# Patient Record
Sex: FEMALE | Race: WHITE | Employment: FULL TIME | URBAN - METROPOLITAN AREA
[De-identification: names, ages, dates, MRNs, and addresses within clinical notes are randomized per-mention and may not be internally consistent; named-entity substitution may affect disease eponyms.]

---
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
## 2020-10-30 ENCOUNTER — TELEPHONE (OUTPATIENT)
Dept: PRIMARY CARE CLINIC | Age: 20
End: 2020-10-30

## 2020-10-30 NOTE — TELEPHONE ENCOUNTER
Pt states that her sister was dx'd with covid today. Symptoms started on Mon the 26th. Pt lives with her and is states that she is taking a  class and was told she had to be tested. Doesn't she need to quarantine 14 days after her sister does the 10 day and w/out fever symptoms for 3 days. Pt wants an order to be tested, she has no symptoms.

## 2020-10-31 ENCOUNTER — HOSPITAL ENCOUNTER (OUTPATIENT)
Age: 20
Discharge: HOME | End: 2020-10-31
Payer: COMMERCIAL

## 2020-11-01 ENCOUNTER — HOSPITAL ENCOUNTER (OUTPATIENT)
Age: 20
Discharge: HOME | End: 2020-11-01
Payer: COMMERCIAL

## 2020-11-01 PROCEDURE — U0003 INFECTIOUS AGENT DETECTION BY NUCLEIC ACID (DNA OR RNA); SEVERE ACUTE RESPIRATORY SYNDROME CORONAVIRUS 2 (SARS-COV-2) (CORONAVIRUS DISEASE [COVID-19]), AMPLIFIED PROBE TECHNIQUE, MAKING USE OF HIGH THROUGHPUT TECHNOLOGIES AS DESCRIBED BY CMS-2020-01-R: HCPCS

## 2020-11-02 LAB
SARS-COV-2, RAPID: NORMAL
SARS-COV-2: NORMAL
SARS-COV-2: NOT DETECTED
SOURCE: NORMAL

## 2021-02-15 ENCOUNTER — TELEPHONE (OUTPATIENT)
Dept: PRIMARY CARE CLINIC | Age: 21
End: 2021-02-15

## 2021-02-18 ENCOUNTER — NURSE ONLY (OUTPATIENT)
Dept: PRIMARY CARE CLINIC | Age: 21
End: 2021-02-18
Payer: COMMERCIAL

## 2021-02-18 VITALS
HEIGHT: 65 IN | SYSTOLIC BLOOD PRESSURE: 102 MMHG | WEIGHT: 123.4 LBS | BODY MASS INDEX: 20.56 KG/M2 | TEMPERATURE: 97.5 F | DIASTOLIC BLOOD PRESSURE: 74 MMHG

## 2021-02-18 DIAGNOSIS — Z00.00 HEALTH CARE MAINTENANCE: Primary | ICD-10-CM

## 2021-02-18 DIAGNOSIS — Z23 NEED FOR INFLUENZA VACCINATION: ICD-10-CM

## 2021-02-18 DIAGNOSIS — Z88.9 MULTIPLE ALLERGIES: ICD-10-CM

## 2021-02-18 DIAGNOSIS — Z30.09 ENCOUNTER FOR COUNSELING REGARDING CONTRACEPTION: ICD-10-CM

## 2021-02-18 LAB
CONTROL: NORMAL
PREGNANCY TEST URINE, POC: NEGATIVE

## 2021-02-18 PROCEDURE — 99395 PREV VISIT EST AGE 18-39: CPT | Performed by: PHYSICIAN ASSISTANT

## 2021-02-18 PROCEDURE — 90471 IMMUNIZATION ADMIN: CPT | Performed by: PHYSICIAN ASSISTANT

## 2021-02-18 PROCEDURE — 90686 IIV4 VACC NO PRSV 0.5 ML IM: CPT | Performed by: PHYSICIAN ASSISTANT

## 2021-02-18 PROCEDURE — 81025 URINE PREGNANCY TEST: CPT | Performed by: PHYSICIAN ASSISTANT

## 2021-02-18 RX ORDER — FLUTICASONE PROPIONATE 50 MCG
1 SPRAY, SUSPENSION (ML) NASAL DAILY
Qty: 3 BOTTLE | Refills: 3 | Status: SHIPPED | OUTPATIENT
Start: 2021-02-18

## 2021-02-18 RX ORDER — CETIRIZINE HYDROCHLORIDE 10 MG/1
10 TABLET ORAL DAILY
Qty: 90 TABLET | Refills: 3 | Status: SHIPPED | OUTPATIENT
Start: 2021-02-18 | End: 2021-12-27 | Stop reason: SDUPTHER

## 2021-02-18 SDOH — ECONOMIC STABILITY: TRANSPORTATION INSECURITY
IN THE PAST 12 MONTHS, HAS LACK OF TRANSPORTATION KEPT YOU FROM MEETINGS, WORK, OR FROM GETTING THINGS NEEDED FOR DAILY LIVING?: PATIENT DECLINED

## 2021-02-18 SDOH — ECONOMIC STABILITY: INCOME INSECURITY: HOW HARD IS IT FOR YOU TO PAY FOR THE VERY BASICS LIKE FOOD, HOUSING, MEDICAL CARE, AND HEATING?: PATIENT DECLINED

## 2021-02-18 ASSESSMENT — ENCOUNTER SYMPTOMS
CONSTIPATION: 0
CHEST TIGHTNESS: 0
BLOOD IN STOOL: 0
VOMITING: 0
COUGH: 0
SHORTNESS OF BREATH: 0
VOICE CHANGE: 0
EYE ITCHING: 0
EYE PAIN: 0
NAUSEA: 0
ABDOMINAL PAIN: 0
DIARRHEA: 0
TROUBLE SWALLOWING: 0
BACK PAIN: 0

## 2021-02-18 ASSESSMENT — PATIENT HEALTH QUESTIONNAIRE - PHQ9
1. LITTLE INTEREST OR PLEASURE IN DOING THINGS: 0
SUM OF ALL RESPONSES TO PHQ9 QUESTIONS 1 & 2: 0

## 2021-02-18 NOTE — PROGRESS NOTES
[facility]  [address] [place] [address]  Phone: [phone]  Fax: [phone]    Analisa Rene is a 24 y.o. female who presents today for her medical conditions/complaintsas noted below. 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 Pap--never     Past Medical History:   Diagnosis Date    Allergic rhinitis       Past Surgical History:   Procedure Laterality Date    TIBIA FRACTURE SURGERY Right 01/2018    TONSILLECTOMY       Family History   Problem Relation Age of Onset    Diabetes Mother     Diabetes Father     Heart Disease Father         CHF    Hypertension Father      Social History     Tobacco Use    Smoking status: Never Smoker    Smokeless tobacco: Never Used   Substance Use Topics    Alcohol use: No      Current Outpatient Medications   Medication Sig Dispense Refill    desogestrel-ethinyl estradiol (CYCLESSA) 0.1/0.125/0.15 -0.025 MG tablet Take 1 tablet by mouth daily 28 tablet 3    fluticasone (FLONASE) 50 MCG/ACT nasal spray 1 spray by Nasal route daily 3 Bottle 3    cetirizine (ZYRTEC) 10 MG tablet Take 1 tablet by mouth daily 90 tablet 3     No current facility-administered medications for this visit.       Allergies   Allergen Reactions    Cat Hair Extract     Dust Mite Extract     Seasonal        Health Maintenance   Topic Date Due    Hepatitis C screen  2000    HPV vaccine (1 - 2-dose series) 01/20/2011    HIV screen  01/20/2015    Chlamydia screen  01/20/2016    Hepatitis A vaccine (2 of 2 - 2-dose series) 01/07/2018    Cervical cancer screen  01/20/2021    DTaP/Tdap/Td vaccine (7 - Td) 04/11/2022    Hepatitis B vaccine  Completed    Hib vaccine Completed    Varicella vaccine  Completed    Meningococcal (ACWY) vaccine  Completed    Flu vaccine  Completed    Pneumococcal 0-64 years Vaccine  Aged Out       Subjective:      Review of Systems   Constitutional: Negative for activity change, appetite change, chills, fatigue, fever and unexpected weight change. HENT: Negative for dental problem, hearing loss, trouble swallowing and voice change. Eyes: Negative for pain, itching and visual disturbance. Respiratory: Negative for cough, chest tightness and shortness of breath. Cardiovascular: Negative for chest pain, palpitations and leg swelling. Gastrointestinal: Negative for abdominal pain, blood in stool, constipation, diarrhea, nausea and vomiting. Endocrine: Negative for cold intolerance, heat intolerance, polydipsia, polyphagia and polyuria. Genitourinary: Negative for difficulty urinating, dysuria, flank pain, frequency, hematuria, menstrual problem, pelvic pain, urgency, vaginal bleeding, vaginal discharge and vaginal pain. Musculoskeletal: Negative for arthralgias, back pain and myalgias. Skin: Negative for rash. Neurological: Negative for dizziness, syncope, speech difficulty, light-headedness and headaches. Hematological: Does not bruise/bleed easily. Psychiatric/Behavioral: Negative for dysphoric mood and sleep disturbance. The patient is not nervous/anxious. Objective:     Vitals:    02/18/21 0908   BP: 102/74   Temp: 97.5 °F (36.4 °C)   Weight: 123 lb 6.4 oz (56 kg)   Height: 5' 5.35\" (1.66 m)     Physical Exam  Vitals signs reviewed. Constitutional:       General: She is not in acute distress. Appearance: She is well-developed. HENT:      Head: Normocephalic and atraumatic. Right Ear: External ear normal.      Left Ear: External ear normal.      Nose: Nose normal.      Mouth/Throat:      Pharynx: No oropharyngeal exudate. Eyes:      General: No scleral icterus. Right eye: No discharge. Left eye: No discharge. Conjunctiva/sclera: Conjunctivae normal.      Pupils: Pupils are equal, round, and reactive to light. Neck:      Musculoskeletal: Normal range of motion. Thyroid: No thyromegaly. Cardiovascular:      Rate and Rhythm: Normal rate and regular rhythm. Heart sounds: Normal heart sounds. No murmur. No friction rub. No gallop. Pulmonary:      Effort: Pulmonary effort is normal.      Breath sounds: Normal breath sounds. No wheezing or rales. Abdominal:      General: Bowel sounds are normal. There is no distension. Palpations: Abdomen is soft. There is no mass. Tenderness: There is no abdominal tenderness. There is no guarding or rebound. Musculoskeletal: Normal range of motion. General: No deformity. Lymphadenopathy:      Cervical: No cervical adenopathy. Skin:     General: Skin is warm. Capillary Refill: Capillary refill takes less than 2 seconds. Findings: No rash. Neurological:      Mental Status: She is alert and oriented to person, place, and time. Motor: No abnormal muscle tone. Coordination: Coordination normal.   Psychiatric:         Behavior: Behavior normal.         Thought Content: Thought content normal.         Judgment: Judgment normal.         Assessment:      Diagnosis Orders   1. Health care maintenance     2. Need for influenza vaccination  INFLUENZA, QUADV, 3 YRS AND OLDER, IM PF, PREFILL SYR OR SDV, 0.5ML (AFLURIA QUADV, PF)   3. Encounter for counseling regarding contraception  desogestrel-ethinyl estradiol (CYCLESSA) 0.1/0.125/0.15 -0.025 MG tablet    POCT urine pregnancy   4.  Multiple allergies  fluticasone (FLONASE) 50 MCG/ACT nasal spray    cetirizine (ZYRTEC) 10 MG tablet         Plan:   Discussed options OCP  Discussed risks and benefits of OCP   Copy of vaccines to her  Will complete from when she brings it  Urine preg is negative  Sent Cyclessa  Discussed well woman exams  Flu vax today   Return in about 3 months (around 2021) for Well woman. Orders Placed This Encounter   Procedures    INFLUENZA, QUADV, 3 YRS AND OLDER, IM PF, PREFILL SYR OR SDV, 0.5ML (AFLURIA QUADV, PF)    POCT urine pregnancy     Orders Placed This Encounter   Medications    desogestrel-ethinyl estradiol (CYCLESSA) 0.1/0.125/0.15 -0.025 MG tablet     Sig: Take 1 tablet by mouth daily     Dispense:  28 tablet     Refill:  3    fluticasone (FLONASE) 50 MCG/ACT nasal spray     Si spray by Nasal route daily     Dispense:  3 Bottle     Refill:  3    cetirizine (ZYRTEC) 10 MG tablet     Sig: Take 1 tablet by mouth daily     Dispense:  90 tablet     Refill:  3       Patient given educational materials - see patient instructions. Discussed use,benefit, and side effects of prescribed medications. All patient questions answered. Pt voiced understanding. Reviewed health maintenance. Instructed to continue currentmedications, healthy diet and regular, aerobic exercise.           Electronically signed by Neelam Davis PA-C on 2021 at 9:43 AM

## 2021-06-16 DIAGNOSIS — Z30.09 ENCOUNTER FOR COUNSELING REGARDING CONTRACEPTION: ICD-10-CM

## 2021-07-06 DIAGNOSIS — Z30.09 ENCOUNTER FOR COUNSELING REGARDING CONTRACEPTION: ICD-10-CM

## 2021-07-06 NOTE — TELEPHONE ENCOUNTER
PT SCHEDULED A PAP WITH YOU ON  7/20/21. WILL BE OUT OF HER B/C ON 7/17/21. USES [facility] Thakur Delvin LISTED.

## 2021-07-20 ENCOUNTER — HOSPITAL ENCOUNTER (OUTPATIENT)
Age: 21
Setting detail: SPECIMEN
Discharge: HOME | End: 2021-07-20
Payer: COMMERCIAL

## 2021-07-20 ENCOUNTER — OFFICE VISIT (OUTPATIENT)
Dept: PRIMARY CARE CLINIC | Age: 21
End: 2021-07-20
Payer: COMMERCIAL

## 2021-07-20 VITALS
DIASTOLIC BLOOD PRESSURE: 70 MMHG | BODY MASS INDEX: 20.66 KG/M2 | HEART RATE: 63 BPM | SYSTOLIC BLOOD PRESSURE: 108 MMHG | OXYGEN SATURATION: 95 % | HEIGHT: 65 IN | WEIGHT: 124 LBS

## 2021-07-20 DIAGNOSIS — Z01.419 ENCOUNTER FOR GYNECOLOGICAL EXAMINATION: Primary | ICD-10-CM

## 2021-07-20 DIAGNOSIS — Z30.09 ENCOUNTER FOR COUNSELING REGARDING CONTRACEPTION: ICD-10-CM

## 2021-07-20 PROCEDURE — 99395 PREV VISIT EST AGE 18-39: CPT | Performed by: PHYSICIAN ASSISTANT

## 2021-07-20 NOTE — PROGRESS NOTES
[facility]  [address] [place] [address]  Phone: [phone]  Fax: [phone]    Johanny Lyle is a 24 y.o. female who presents today for her medicalconditions/complaints as noted below. Chief Complaint   Patient presents with    Gynecologic Exam     patients first pap     Allergies   Allergen Reactions    Cat Hair Extract     Dust Mite Extract     Seasonal      Current Outpatient Medications   Medication Sig Dispense Refill    desogestrel-ethinyl estradiol (CYCLESSA) 0.1/0.125/0.15 -0.025 MG tablet Take 1 tablet by mouth daily 28 tablet 11    fluticasone (FLONASE) 50 MCG/ACT nasal spray 1 spray by Nasal route daily 3 Bottle 3    cetirizine (ZYRTEC) 10 MG tablet Take 1 tablet by mouth daily 90 tablet 3     No current facility-administered medications for this visit.      Past Medical History:   Diagnosis Date    Allergic rhinitis      Social History     Socioeconomic History    Marital status: Single     Spouse name: Not on file    Number of children: Not on file    Years of education: Not on file    Highest education level: Not on file   Occupational History     Employer: [employer]   Tobacco Use    Smoking status: Never Smoker    Smokeless tobacco: Never Used   Vaping Use    Vaping Use: Never used   Substance and Sexual Activity    Alcohol use: No    Drug use: No    Sexual activity: Never   Other Topics Concern    Not on file   Social History Narrative    Not on file     Social Determinants of Health     Financial Resource Strain: Unknown    Difficulty of Paying Living Expenses: Patient refused   Food Insecurity: Unknown    Worried About Running Out of Food in the Last Year: Patient refused    [address] in the Last Year: Patient refused   Transportation Needs: Unknown    Lack of Transportation (Medical): Patient refused    Lack of Transportation (Non-Medical): Patient refused   Physical Activity:     Days of Exercise per Week:     Minutes of Exercise per Session:    Stress:     Feeling of Stress :    Social Connections:     Frequency of Communication with Friends and Family:     Frequency of Social Gatherings with Friends and Family:     Attends Denominational Services:     Active Member of Clubs or Organizations:     Attends Club or Organization Meetings:     Marital Status:    Intimate Partner Violence:     Fear of Current or Ex-Partner:     Emotionally Abused:     Physically Abused:     Sexually Abused:        HPI:     Last Pap:  patient has never had a pap test  Last Menstrual Period:No LMP recorded. Problems with menstruation: N/A  Hysterectomy:  No   Ovaries:  Yes  Signs of Menopause:  No    Pregnancies:  No     SexualActivity:  No   Current:  1    Lifetime:  1  STD History:  none  Birth Control:  Yes no problems with her Cyclessa use  Breast Concerns:  No  Vaginal [place]:  No  Urinary Problems:  No  Bowel Problems:  No          Last Colonoscopy:  never  HPV Vaccination:  never    Objective:   Review of Systems   Constitutional: Negative for chills, fatigue and fever. Endocrine: Negative for cold intolerance and heat intolerance. Genitourinary: Negative for decreased urine volume, difficulty urinating, dyspareunia, dysuria, frequency, genital sores, hematuria, menstrual problem, pelvic pain, urgency, vaginal bleeding, vaginal discharge and vaginal pain. Musculoskeletal: Negative for myalgias. Skin: Negative for rash. Psychiatric/Behavioral: Negative for dysphoric mood and sleep disturbance. The patient is not nervous/anxious. /70   Pulse 63   Ht 5' 5.35\" (1.66 m)   Wt 124 lb (56.2 kg)   SpO2 95%   BMI 20.41 kg/m²   Physical Exam  Vitals reviewed. Exam conducted with a chaperone present. Constitutional:       Appearance: Normal appearance. Chest:      Breasts: Breasts are symmetrical.         Right: No inverted nipple, mass, nipple discharge, skin change or tenderness.          Left: No inverted nipple, mass, nipple discharge, skin change or tenderness. Genitourinary:     Exam position: Supine. Pubic Area: No rash. Labia:         Right: No rash, tenderness or lesion. Left: No rash, tenderness or lesion. Urethra: No prolapse or urethral swelling. Vagina: Normal.      Cervix: Normal.      Uterus: Normal.       Adnexa: Right adnexa normal and left adnexa normal.        Left: No tenderness. Lymphadenopathy:      Upper Body:      Right upper body: No axillary or pectoral adenopathy. Left upper body: No axillary or pectoral adenopathy. Neurological:      Mental Status: She is alert. Assessment:      Diagnosis Orders   1. Encounter for gynecological examination  PAP Smear   2. Encounter for counseling regarding contraception  desogestrel-ethinyl estradiol (CYCLESSA) 0.1/0.125/0.15 -0.025 MG tablet     Orders Placed This Encounter   Procedures    PAP Smear     Patient History:    No LMP recorded. OBGYN Status: Having periods  Past Surgical History:  01/2018: TIBIA FRACTURE SURGERY; Right  No date: TONSILLECTOMY  Medications/Contraceptives Affecting Cytology     Combination Contraceptives - Oral Disp Start End     desogestrel-ethinyl estradiol (CYCLESSA) 0.1/0.125/0.15 -0.025 MG tablet      28 tablet 7/6/2021     Sig: Take 1 tablet by mouth daily    Route: Oral        Social History    Tobacco Use      Smoking status: Never Smoker      Smokeless tobacco: Never Used       Standing Status:   Future     Standing Expiration Date:   7/20/2022     Order Specific Question:   Collection Type     Answer: Thin Prep     Order Specific Question:   Prior Abnormal Pap Test     Answer:   No     Order Specific Question:   Screening or Diagnostic     Answer:   Screening     Order Specific Question:   HPV Requested?      Answer:   Yes -  If ASCUS Reflex HPV     Order Specific Question:   High Risk Patient     Answer:   N/A       Plan:   Pap specimen taken  Reviewed self breast exam.    Safe Sex. Risks and benefits of birth control.     REfilled OCP      Electronically signed by Sheela Hughes PA-C on 7/20/2021 at 10:07 AM

## 2021-07-27 LAB — CYTOLOGY REPORT: NORMAL

## 2021-11-22 DIAGNOSIS — Z88.9 MULTIPLE ALLERGIES: ICD-10-CM

## 2021-11-22 RX ORDER — MONTELUKAST SODIUM 10 MG/1
10 TABLET ORAL NIGHTLY
Qty: 90 TABLET | Refills: 1 | Status: SHIPPED | OUTPATIENT
Start: 2021-11-22 | End: 2022-04-04

## 2021-12-27 ENCOUNTER — OFFICE VISIT (OUTPATIENT)
Dept: PRIMARY CARE CLINIC | Age: 21
End: 2021-12-27
Payer: COMMERCIAL

## 2021-12-27 VITALS
HEART RATE: 84 BPM | WEIGHT: 141 LBS | OXYGEN SATURATION: 98 % | SYSTOLIC BLOOD PRESSURE: 120 MMHG | DIASTOLIC BLOOD PRESSURE: 70 MMHG | HEIGHT: 65 IN | BODY MASS INDEX: 23.49 KG/M2

## 2021-12-27 DIAGNOSIS — R22.0 FACIAL SWELLING: ICD-10-CM

## 2021-12-27 DIAGNOSIS — T78.40XA ALLERGIC REACTION, INITIAL ENCOUNTER: Primary | ICD-10-CM

## 2021-12-27 PROCEDURE — 99213 OFFICE O/P EST LOW 20 MIN: CPT | Performed by: PHYSICIAN ASSISTANT

## 2021-12-27 RX ORDER — PREDNISONE 20 MG/1
20 TABLET ORAL 2 TIMES DAILY
Qty: 10 TABLET | Refills: 0 | Status: SHIPPED | OUTPATIENT
Start: 2021-12-27 | End: 2022-01-01

## 2021-12-27 RX ORDER — CETIRIZINE HYDROCHLORIDE 10 MG/1
10 TABLET ORAL DAILY
Qty: 30 TABLET | Refills: 2 | Status: SHIPPED | OUTPATIENT
Start: 2021-12-27 | End: 2022-01-26

## 2021-12-27 ASSESSMENT — ENCOUNTER SYMPTOMS
COLOR CHANGE: 0
VOICE CHANGE: 0
SORE THROAT: 0
SINUS PAIN: 0
TROUBLE SWALLOWING: 0
EYE DISCHARGE: 0
SHORTNESS OF BREATH: 0
RHINORRHEA: 1
SINUS PRESSURE: 0
RESPIRATORY NEGATIVE: 1

## 2021-12-27 NOTE — PROGRESS NOTES
[facility]  [address] [place] [address]  Phone: [phone]  Fax: [phone]    Alexander Jones is a 24 y.o. female who presents today for her medical conditions/complaintsas noted below. Chief Complaint   Patient presents with    Facial Droop     left side         HPI:     HPI  Feels that her face is droopy for 1 week now. No arm or leg weakness or numbness. No personal or family hx of CVA. Is on OCP    Eyelid, nose, and upper lip is swollen on left side only   New puppy is only new exposure        Current Outpatient Medications   Medication Sig Dispense Refill    predniSONE (DELTASONE) 20 MG tablet Take 1 tablet by mouth 2 times daily for 5 days 10 tablet 0    cetirizine (ZYRTEC) 10 MG tablet Take 1 tablet by mouth daily 30 tablet 2    montelukast (SINGULAIR) 10 MG tablet Take 1 tablet by mouth nightly 90 tablet 1    desogestrel-ethinyl estradiol (CYCLESSA) 0.1/0.125/0.15 -0.025 MG tablet Take 1 tablet by mouth daily 28 tablet 11    fluticasone (FLONASE) 50 MCG/ACT nasal spray 1 spray by Nasal route daily 3 Bottle 3     No current facility-administered medications for this visit. Allergies   Allergen Reactions    Cat Hair Extract     Dust Mite Extract     Seasonal        Subjective:      Review of Systems   Constitutional: Negative for chills, diaphoresis, fatigue and fever. HENT: Positive for rhinorrhea. Negative for congestion, drooling, mouth sores, sinus pressure, sinus pain, sneezing, sore throat, trouble swallowing and voice change. Eyes: Negative for discharge and visual disturbance. Respiratory: Negative. Negative for shortness of breath. Cardiovascular: Negative. Negative for palpitations. Musculoskeletal: Negative for neck pain. Skin: Negative for color change and rash. Neurological: Positive for facial asymmetry (left drooping). Negative for dizziness, speech difficulty, weakness and numbness.    Hematological: Negative for adenopathy. Psychiatric/Behavioral: Negative for confusion. Objective:     /70   Pulse 84   Ht 5' 5.35\" (1.66 m)   Wt 141 lb (64 kg)   SpO2 98%   BMI 23.21 kg/m²   Physical Exam  Vitals and nursing note reviewed. Constitutional:       Appearance: Normal appearance. She is not ill-appearing. HENT:      Right Ear: Tympanic membrane, ear canal and external ear normal.      Left Ear: Tympanic membrane, ear canal and external ear normal.      Nose: Nose normal.      Left Sinus: No maxillary sinus tenderness or frontal sinus tenderness. Comments: Left nose swollen--area marked is swollen     Mouth/Throat:      Lips: No lesions. Mouth: Mucous membranes are moist. Angioedema present. Tongue: No lesions. Tongue does not deviate from midline. Palate: No mass and lesions. Pharynx: No oropharyngeal exudate or posterior oropharyngeal erythema. Comments: Upper left lip swoolen  Eyes:      General:         Right eye: No discharge. Left eye: No discharge. Extraocular Movements: Extraocular movements intact. Conjunctiva/sclera: Conjunctivae normal.      Pupils: Pupils are equal, round, and reactive to light. Cardiovascular:      Rate and Rhythm: Normal rate and regular rhythm. Heart sounds: Normal heart sounds. Pulmonary:      Effort: Pulmonary effort is normal.      Breath sounds: Normal breath sounds. Musculoskeletal:      Cervical back: Normal range of motion. Lymphadenopathy:      Head:      Right side of head: No submental, submandibular, tonsillar, preauricular, posterior auricular or occipital adenopathy. Left side of head: No submental, submandibular, tonsillar, preauricular, posterior auricular or occipital adenopathy. Cervical: No cervical adenopathy. Right cervical: No superficial, deep or posterior cervical adenopathy. Left cervical: No superficial, deep or posterior cervical adenopathy.    Skin: Findings: No rash. Comments: Few acne bumps   Neurological:      Mental Status: She is alert and oriented to person, place, and time. Cranial Nerves: Cranial nerves are intact. Sensory: Sensation is intact. Motor: Motor function is intact. Coordination: Coordination is intact. Gait: Gait is intact. Deep Tendon Reflexes: Reflexes normal.   Psychiatric:         Mood and Affect: Mood normal.         Assessment:       Diagnosis Orders   1. Allergic reaction, initial encounter     2. Facial swelling          Plan:     Steroid burst given  Take zyrtec daily and Benedryl at night  If not resolving, may need allergist  Could be dander from new dog. Discussed  Covid vaccine   No follow-ups on file. No orders of the defined types were placed in this encounter.     Orders Placed This Encounter   Medications    predniSONE (DELTASONE) 20 MG tablet     Sig: Take 1 tablet by mouth 2 times daily for 5 days     Dispense:  10 tablet     Refill:  0    cetirizine (ZYRTEC) 10 MG tablet     Sig: Take 1 tablet by mouth daily     Dispense:  30 tablet     Refill:  2           Electronically signed by Zayra Culp 12/27/2021 at 4:46 PM

## 2022-01-08 ENCOUNTER — NURSE TRIAGE (OUTPATIENT)
Dept: OTHER | Facility: CLINIC | Age: 22
End: 2022-01-08

## 2022-01-08 DIAGNOSIS — R22.0 FACIAL SWELLING: Primary | ICD-10-CM

## 2022-01-08 DIAGNOSIS — Z88.9 MULTIPLE ALLERGIES: ICD-10-CM

## 2022-01-08 NOTE — TELEPHONE ENCOUNTER
Received call from christy at [facility] with Red Flag Complaint. Subjective: Caller states \"about 2 week ago I started having sluggish response leg side. A week after that went to doctor , more swelling than droopiness. steroids 5 days. Doesn't get better let me know. A little better with steroids , woke up this morning almost felt swelling , hasn't gotten fully better. After got off them back to what it was. \"    Current Symptoms: eyelid and under eyes  , nose and upper lip swollen -On left side   Pt denies any arm or legs, weakness, shortness of breath or chest pains,  numbness. Reviewed OV notes 12/27/2021 seen for these symptoms given pred burst.     Has been taking zyrtec and benedryl    Onset: 2 weeks ago improving     Associated Symptoms: pt states that symptoms returned after steriod burst     Pain Severity: dneies    Temperature: denies  What has been tried: zyrtec , benydral     LMP: ended the first  Pregnant: No    Recommended disposition: see PCP within 3 days    Care advice provided, patient verbalizes understanding; denies any other questions or concerns; instructed to call back for any new or worsening symptoms. writer attempted to provide a warm transfer to  [facility] for appointment scheduling     Attention Provider: Thank you for allowing me to participate in the care of your patient. The patient was connected to triage in response to information provided to the ECC/PSC. Please do not respond through this encounter as the response is not directed to a shared pool.           Reason for Disposition   [1] Mild facial swelling (puffiness) AND [2] persists > 3 days     Patient seen for this 12/27/021 in office reviewed notes, patient reporting symptoms returned after pred burst denies any new or worsening symptoms    Protocols used: [facility]

## 2022-01-09 NOTE — TELEPHONE ENCOUNTER
She can come back to see me if she wants. She needs referred to allergist and to try to figure out what she is allergic to.

## 2022-01-11 ENCOUNTER — HOSPITAL ENCOUNTER (OUTPATIENT)
Age: 22
Setting detail: SPECIMEN
Discharge: HOME | End: 2022-01-11

## 2022-01-11 ENCOUNTER — OFFICE VISIT (OUTPATIENT)
Dept: PRIMARY CARE CLINIC | Age: 22
End: 2022-01-11
Payer: COMMERCIAL

## 2022-01-11 VITALS
TEMPERATURE: 97.7 F | OXYGEN SATURATION: 100 % | SYSTOLIC BLOOD PRESSURE: 118 MMHG | HEART RATE: 60 BPM | WEIGHT: 136.2 LBS | DIASTOLIC BLOOD PRESSURE: 80 MMHG | HEIGHT: 65 IN | BODY MASS INDEX: 22.69 KG/M2

## 2022-01-11 DIAGNOSIS — R22.0 FACIAL SWELLING: Primary | ICD-10-CM

## 2022-01-11 DIAGNOSIS — R22.0 FACIAL SWELLING: ICD-10-CM

## 2022-01-11 PROCEDURE — 99213 OFFICE O/P EST LOW 20 MIN: CPT | Performed by: PHYSICIAN ASSISTANT

## 2022-01-11 RX ORDER — PREDNISONE 20 MG/1
20 TABLET ORAL 2 TIMES DAILY
Qty: 10 TABLET | Refills: 0 | Status: SHIPPED | OUTPATIENT
Start: 2022-01-11 | End: 2022-01-16

## 2022-01-11 ASSESSMENT — ENCOUNTER SYMPTOMS
TROUBLE SWALLOWING: 0
RESPIRATORY NEGATIVE: 1
SORE THROAT: 0
CHOKING: 0
FACIAL SWELLING: 1
EYES NEGATIVE: 1

## 2022-01-11 NOTE — PROGRESS NOTES
[facility] PRIMARY CARE  [address] [facility]  [facility] [address]  Dept: [phone]    Marc Mcmahon is a 24 y.o. female who presents today for her medical conditions/complaints as noted below. Chief Complaint   Patient presents with    Facial Droop     patient is here today for recurrent concern- left side face droop, patient states when she was taking steroid face was clearing but NOT fully, patient states when she completed steroid face swelling got worse, patient denies pain, pt c/o severe swelling when she wakes up in the morning. HPI:     HPI  Steroid helped by third day   Now it is back  Has not been able to figure out a trigger  No pain, discharge, drooling, eye watering, fever, LN. No new symptoms.    Paul Pinzon is in 1.5   No results found for: LDLCHOLESTEROL, LDLCALC    (goal LDL is <100)   AST (U/L)   Date Value   01/15/2018 15     ALT (U/L)   Date Value   01/15/2018 9     BUN (mg/dL)   Date Value   01/15/2018 9     BP Readings from Last 3 Encounters:   01/11/22 118/80   12/27/21 120/70   07/20/21 108/70          (goal 120/80)    Past Medical History:   Diagnosis Date    Allergic rhinitis       Past Surgical History:   Procedure Laterality Date    TIBIA FRACTURE SURGERY Right 01/2018    TONSILLECTOMY         Family History   Problem Relation Age of Onset    Diabetes Mother     Diabetes Father     Heart Disease Father         CHF    Hypertension Father        Social History     Tobacco Use    Smoking status: Never Smoker    Smokeless tobacco: Never Used   Substance Use Topics    Alcohol use: No      Current Outpatient Medications   Medication Sig Dispense Refill    predniSONE (DELTASONE) 20 MG tablet Take 1 tablet by mouth 2 times daily for 5 days 10 tablet 0    cetirizine (ZYRTEC) 10 MG tablet Take 1 tablet by mouth daily 30 tablet 2    montelukast (SINGULAIR) 10 MG tablet Take 1 tablet by mouth nightly 90 tablet 1    desogestrel-ethinyl estradiol (CYCLESSA) 0.1/0.125/0.15 -0.025 MG tablet Take 1 tablet by mouth daily 28 tablet 11    fluticasone (FLONASE) 50 MCG/ACT nasal spray 1 spray by Nasal route daily 3 Bottle 3     No current facility-administered medications for this visit. Allergies   Allergen Reactions    Cat Hair Extract     Dust Mite Extract     Seasonal        Health Maintenance   Topic Date Due    Hepatitis C screen  Never done    COVID-19 Vaccine (1) Never done    HPV vaccine (1 - 2-dose series) Never done    HIV screen  Never done    Chlamydia screen  Never done    Hepatitis A vaccine (2 of 2 - 2-dose series) 01/07/2018    Depression Screen  02/18/2022    DTaP/Tdap/Td vaccine (7 - Td or Tdap) 04/11/2022    Pap smear  07/20/2024    Hepatitis B vaccine  Completed    Hib vaccine  Completed    Varicella vaccine  Completed    Meningococcal (ACWY) vaccine  Completed    Flu vaccine  Completed    Pneumococcal 0-64 years Vaccine  Aged Out       Subjective:      Review of Systems   Constitutional: Negative for chills, diaphoresis and fever. HENT: Positive for facial swelling. Negative for congestion, ear pain, mouth sores, sore throat and trouble swallowing. Eyes: Negative. Respiratory: Negative. Negative for choking. Cardiovascular: Negative. Skin: Negative for rash and wound. Neurological: Negative for syncope, facial asymmetry, light-headedness and headaches. Hematological: Negative for adenopathy. Objective:     /80   Pulse 60   Temp 97.7 °F (36.5 °C) (Temporal)   Ht 5' 5.35\" (1.66 m)   Wt 136 lb 3.2 oz (61.8 kg)   SpO2 100%   BMI 22.42 kg/m²   Physical Exam  Vitals and nursing note reviewed. Constitutional:       Appearance: She is not ill-appearing.    HENT:      Head:      Comments: Left upper eyelid and upper lip and nare are mildly swollen     Left Ear: Hearing, tympanic membrane, ear canal and external ear normal.      Nose: No septal deviation, laceration, mucosal edema or rhinorrhea. Left Nostril: No foreign body, epistaxis, septal hematoma or occlusion. Left Turbinates: Not enlarged, swollen or pale. Left Sinus: No maxillary sinus tenderness or frontal sinus tenderness. Mouth/Throat:      Lips: No lesions. Mouth: Mucous membranes are moist. No injury, lacerations or oral lesions. Dentition: No gingival swelling, dental abscesses or gum lesions. Tongue: No lesions. Tongue does not deviate from midline. Palate: No mass and lesions. Pharynx: Oropharynx is clear. Uvula midline. No pharyngeal swelling, oropharyngeal exudate, posterior oropharyngeal erythema or uvula swelling. Tonsils: No tonsillar exudate or tonsillar abscesses. Cardiovascular:      Rate and Rhythm: Normal rate and regular rhythm. Heart sounds: Normal heart sounds. Pulmonary:      Effort: Pulmonary effort is normal.      Breath sounds: Normal breath sounds. Lymphadenopathy:      Head:      Right side of head: No submental, submandibular, tonsillar, preauricular, posterior auricular or occipital adenopathy. Left side of head: No submental, submandibular, tonsillar, preauricular, posterior auricular or occipital adenopathy. Cervical: No cervical adenopathy. Skin:     Findings: No rash. Neurological:      Mental Status: She is alert and oriented to person, place, and time. Psychiatric:         Mood and Affect: Mood normal.         Assessment:       Diagnosis Orders   1. Facial swelling  Brandan Dudley MD, Allergy & Immunology, [place]    Culture, MRSA, Screening        Plan:    Steriod pack again with wedding coming up  Nasal swab to check for infection  Referral to allergy  Stop OCP for one week and see if it clears. Return if symptoms worsen or fail to improve.     Orders Placed This Encounter   Procedures    Culture, MRSA, Screening     Standing Status:   Future     Standing Expiration Date:   1/11/2023   Carla Joseph Verónica Lal MD, Allergy & Immunology, [facility]     Referral Priority:   Routine     Referral Type:   Eval and Treat     Referral Reason:   Specialty Services Required     Referred to Provider:   Muriel Holcomb MD     Requested Specialty:   Allergy & Immunology     Number of Visits Requested:   1     Orders Placed This Encounter   Medications    predniSONE (DELTASONE) 20 MG tablet     Sig: Take 1 tablet by mouth 2 times daily for 5 days     Dispense:  10 tablet     Refill:  0       Patient given educational materials - see patient instructions. Discussed use, benefit, and side effects of prescribed medications. All patient questions answered. Pt voiced understanding. Reviewed health maintenance. Instructed to continue current medications, diet and exercise. Patient agreed with treatment plan. Follow up as directed.      Electronically signed by Jaya Whiting PA-C on 1/11/2022 at 3:36 PM

## 2022-01-12 LAB
MRSA, DNA, NASAL: NEGATIVE
SPECIMEN DESCRIPTION: NORMAL

## 2022-04-03 DIAGNOSIS — Z88.9 MULTIPLE ALLERGIES: ICD-10-CM

## 2022-04-04 RX ORDER — MONTELUKAST SODIUM 10 MG/1
TABLET ORAL
Qty: 90 TABLET | Refills: 3 | Status: SHIPPED | OUTPATIENT
Start: 2022-04-04

## 2022-05-17 ENCOUNTER — OFFICE VISIT (OUTPATIENT)
Dept: PRIMARY CARE CLINIC | Age: 22
End: 2022-05-17
Payer: COMMERCIAL

## 2022-05-17 VITALS
DIASTOLIC BLOOD PRESSURE: 80 MMHG | HEART RATE: 68 BPM | SYSTOLIC BLOOD PRESSURE: 126 MMHG | OXYGEN SATURATION: 99 % | WEIGHT: 144.8 LBS | BODY MASS INDEX: 24.12 KG/M2 | HEIGHT: 65 IN

## 2022-05-17 DIAGNOSIS — R22.0 LEFT FACIAL SWELLING: Primary | ICD-10-CM

## 2022-05-17 PROCEDURE — 99214 OFFICE O/P EST MOD 30 MIN: CPT | Performed by: PHYSICIAN ASSISTANT

## 2022-05-17 RX ORDER — FAMOTIDINE 20 MG/1
TABLET, FILM COATED ORAL
COMMUNITY
Start: 2022-04-16

## 2022-05-17 RX ORDER — CETIRIZINE HYDROCHLORIDE 10 MG/1
10 TABLET ORAL DAILY
COMMUNITY

## 2022-05-17 SDOH — ECONOMIC STABILITY: FOOD INSECURITY: WITHIN THE PAST 12 MONTHS, THE FOOD YOU BOUGHT JUST DIDN'T LAST AND YOU DIDN'T HAVE MONEY TO GET MORE.: NEVER TRUE

## 2022-05-17 SDOH — ECONOMIC STABILITY: FOOD INSECURITY: WITHIN THE PAST 12 MONTHS, YOU WORRIED THAT YOUR FOOD WOULD RUN OUT BEFORE YOU GOT MONEY TO BUY MORE.: NEVER TRUE

## 2022-05-17 ASSESSMENT — PATIENT HEALTH QUESTIONNAIRE - PHQ9
2. FEELING DOWN, DEPRESSED OR HOPELESS: 0
SUM OF ALL RESPONSES TO PHQ QUESTIONS 1-9: 0
1. LITTLE INTEREST OR PLEASURE IN DOING THINGS: 0
SUM OF ALL RESPONSES TO PHQ9 QUESTIONS 1 & 2: 0
SUM OF ALL RESPONSES TO PHQ QUESTIONS 1-9: 0

## 2022-05-17 ASSESSMENT — ENCOUNTER SYMPTOMS
RHINORRHEA: 0
EYE DISCHARGE: 1
TROUBLE SWALLOWING: 0
SHORTNESS OF BREATH: 0
PHOTOPHOBIA: 1

## 2022-05-17 ASSESSMENT — SOCIAL DETERMINANTS OF HEALTH (SDOH): HOW HARD IS IT FOR YOU TO PAY FOR THE VERY BASICS LIKE FOOD, HOUSING, MEDICAL CARE, AND HEATING?: NOT HARD AT ALL

## 2022-05-17 NOTE — PROGRESS NOTES
[facility] PRIMARY CARE  [address]  [facility] [address]  Dept: [phone]    Alexia Brown is a 25 y.o. female who presents today for her medical conditions/complaints as noted below. Chief Complaint   Patient presents with    Swelling     swelling in the left eye, patient said this has been a issue since January. Patient said she seen the eye doctor and he suggested a MRI. HPI:     HPI  Swelling on left side of face continues   Taking allergy meds--see med list  Allergist: did not find a particular reason for it. Eye doctor suggested MRI because nothing is physically wrong with eye. [facility]. Does respond to oral steroids. No fevers, trouble swallowing or speaking,no visual disturbance, no headaches, no dizziness or syncope.    No results found for: LDLCHOLESTEROL, LDLCALC    (goal LDL is <100)   AST (U/L)   Date Value   01/15/2018 15     ALT (U/L)   Date Value   01/15/2018 9     BUN (mg/dL)   Date Value   01/15/2018 9     BP Readings from Last 3 Encounters:   05/17/22 126/80   01/11/22 118/80   12/27/21 120/70          (goal 120/80)    Past Medical History:   Diagnosis Date    Allergic rhinitis       Past Surgical History:   Procedure Laterality Date    TIBIA FRACTURE SURGERY Right 01/2018    TONSILLECTOMY         Family History   Problem Relation Age of Onset    Diabetes Mother     Diabetes Father     Heart Disease Father         CHF    Hypertension Father        Social History     Tobacco Use    Smoking status: Never Smoker    Smokeless tobacco: Never Used   Substance Use Topics    Alcohol use: No      Current Outpatient Medications   Medication Sig Dispense Refill    famotidine (PEPCID) 20 MG tablet take 1 tablet by mouth every morning and take 1 tablet every evening      cetirizine (ZYRTEC) 10 MG tablet Take 10 mg by mouth daily      montelukast (SINGULAIR) 10 MG tablet TAKE 1 TABLET BY MOUTH AT  NIGHT 90 tablet 3    desogestrel-ethinyl estradiol (CYCLESSA) 0.1/0.125/0.15 -0.025 MG tablet Take 1 tablet by mouth daily 28 tablet 11    fluticasone (FLONASE) 50 MCG/ACT nasal spray 1 spray by Nasal route daily 3 Bottle 3     No current facility-administered medications for this visit. Allergies   Allergen Reactions    Cat Hair Extract     Dust Mite Extract     Seasonal        Health Maintenance   Topic Date Due    HPV vaccine (1 - 2-dose series) Never done    HIV screen  Never done    Chlamydia screen  Never done    Hepatitis A vaccine (2 of 2 - 2-dose series) 01/07/2018    Hepatitis C screen  Never done    Depression Screen  02/18/2022    DTaP/Tdap/Td vaccine (7 - Td or Tdap) 04/11/2022    COVID-19 Vaccine (1) 05/17/2023 (Originally 1/20/2005)    Pap smear  07/20/2024    Hepatitis B vaccine  Completed    Hib vaccine  Completed    Varicella vaccine  Completed    Meningococcal (ACWY) vaccine  Completed    Flu vaccine  Completed    Pneumococcal 0-64 years Vaccine  Aged Out       Subjective:      Review of Systems   Constitutional: Negative for chills, fatigue and fever. HENT: Negative for congestion, dental problem, ear pain, mouth sores, nosebleeds, rhinorrhea and trouble swallowing. Eyes: Positive for photophobia (mild) and discharge (watery). Negative for visual disturbance. Respiratory: Negative for shortness of breath. Allergic/Immunologic: Positive for environmental allergies. Negative for immunocompromised state. Neurological: Negative for dizziness, syncope, numbness and headaches. Hematological: Negative for adenopathy. Objective:     /80   Pulse 68   Ht 5' 5.35\" (1.66 m)   Wt 144 lb 12.8 oz (65.7 kg)   SpO2 99%   BMI 23.84 kg/m²   Physical Exam  Vitals and nursing note reviewed. Constitutional:       Appearance: Normal appearance. She is not ill-appearing. HENT:      Head: Normocephalic and atraumatic. Left periorbital erythema present.  No raccoon eyes, Everett's sign, abrasion, contusion, masses, right periorbital erythema or laceration. Hair is normal.      Jaw: There is normal jaw occlusion. Salivary Glands: Right salivary gland is not diffusely enlarged or tender. Left salivary gland is not diffusely enlarged or tender. Right Ear: Tympanic membrane, ear canal and external ear normal.      Left Ear: Tympanic membrane, ear canal and external ear normal.      Nose: Mucosal edema present. No nasal deformity, septal deviation, signs of injury, laceration, nasal tenderness, congestion or rhinorrhea. Right Nostril: No foreign body, epistaxis, septal hematoma or occlusion. Left Nostril: No foreign body, epistaxis, septal hematoma or occlusion. Right Turbinates: Not enlarged, swollen or pale. Left Turbinates: Not enlarged, swollen or pale. Right Sinus: No maxillary sinus tenderness or frontal sinus tenderness. Left Sinus: No maxillary sinus tenderness or frontal sinus tenderness. Mouth/Throat:      Lips: No lesions. Mouth: Mucous membranes are moist. No oral lesions or angioedema. Dentition: Normal dentition. Tongue: No lesions. Tongue does not deviate from midline. Palate: No mass and lesions. Pharynx: Oropharynx is clear. Uvula midline. No uvula swelling. Tonsils: No tonsillar exudate or tonsillar abscesses. 0 on the right. 0 on the left. Comments: Left upper lip is swollen  Eyes:      General: Gaze aligned appropriately. Left eye: Discharge (watery) present. No foreign body or hordeolum. Extraocular Movements:      Right eye: Normal extraocular motion and no nystagmus. Left eye: Normal extraocular motion and no nystagmus. Conjunctiva/sclera:      Left eye: Left conjunctiva is not injected. No chemosis, exudate or hemorrhage. Comments: Left upper and lower lid is swollen.  Right upper lid is swollen   Cardiovascular:      Rate and Rhythm: Normal rate and regular rhythm. Heart sounds: Normal heart sounds. Pulmonary:      Effort: Pulmonary effort is normal.      Breath sounds: Normal breath sounds. Chest:   Breasts:      Left: No supraclavicular adenopathy. Musculoskeletal:      Cervical back: Normal range of motion. Lymphadenopathy:      Cervical: No cervical adenopathy. Upper Body:      Left upper body: No supraclavicular adenopathy. Skin:     Findings: No rash. Neurological:      Mental Status: She is alert and oriented to person, place, and time. Psychiatric:         Mood and Affect: Mood normal.         Assessment:       Diagnosis Orders   1. Left facial swelling  MRI BRAIN Jax Hernadez MD, Neurology, [place]        Plan:    MRI w/w/o contrast  Get note from eye doctor and allergy note  Seeing dentist soon--no dental issues  Referred to neurologist  D/W Dr. Faye Maciel    Return in about 2 months (around 7/17/2022). Orders Placed This Encounter   Procedures    MRI BRAIN W WO CONTRAST     Standing Status:   Future     Standing Expiration Date:   5/17/2023     Order Specific Question:   STAT Creatinine as needed:     Answer:   Yes     Order Specific Question:   Reason for exam:     Answer:   persistent facial swelling     Order Specific Question:   Reason for exam:     Answer:   Attn: facial neve/trigeminal nerve     Order Specific Question:   What is the sedation requirement? Answer:   Scarlett Gomez MD, Neurology, [place]     Referral Priority:   Routine     Referral Type:   Eval and Treat     Referral Reason:   Specialty Services Required     Referred to Provider:   Almas Aj MD     Requested Specialty:   Neurology     Number of Visits Requested:   1     No orders of the defined types were placed in this encounter. Patient given educational materials - see patient instructions. Discussed use, benefit, and side effects of prescribed medications. All patient questions answered.  Pt voiced understanding. Reviewed health maintenance. Instructed to continue current medications, diet and exercise. Patient agreed with treatment plan. Follow up as directed.      Electronically signed by Angelic Varner PA-C on 5/17/2022 at 8:28 AM

## 2022-06-02 ENCOUNTER — TELEPHONE (OUTPATIENT)
Dept: PRIMARY CARE CLINIC | Age: 22
End: 2022-06-02

## 2022-06-02 NOTE — TELEPHONE ENCOUNTER
[facility] department called regarding pt's MRI Brain that is scheduled, insurance is requiring peer to peer by 2 pm today, or appointment will have to be cancelled.      If you would like to do peer to peer please call   [phone] Case # [id number]

## 2022-06-02 NOTE — TELEPHONE ENCOUNTER
I called and was told that this was approved this am with Approval # [id number] from June 1- June 30, I did ask if that was correct as that approval number is the same as the case number and she said yes. Please call Kat Tong so the MRI Brain w/w/o contrast at [facility] can be done and let patient know too please.

## 2022-06-06 DIAGNOSIS — R22.0 LEFT FACIAL SWELLING: ICD-10-CM

## 2022-07-11 DIAGNOSIS — Z30.09 ENCOUNTER FOR COUNSELING REGARDING CONTRACEPTION: ICD-10-CM

## 2022-07-11 RX ORDER — DESOGESTREL AND ETHINYL ESTRADIOL 7 DAYS X 3
KIT ORAL
Qty: 28 TABLET | Refills: 11 | Status: SHIPPED | OUTPATIENT
Start: 2022-07-11

## 2023-02-28 DIAGNOSIS — Z88.9 MULTIPLE ALLERGIES: ICD-10-CM

## 2023-03-02 RX ORDER — MONTELUKAST SODIUM 10 MG/1
TABLET ORAL
Qty: 90 TABLET | Refills: 3 | Status: SHIPPED | OUTPATIENT
Start: 2023-03-02

## 2024-01-01 NOTE — TELEPHONE ENCOUNTER
Patient has pap scheduled. Statement Selected Consent: COLOR KEY FOR DIAGRAM BELOW:\\nRed = 1/2 unit of Jeuveau per injection\\nOrange = 1 units of Jeuveau per injection\\nYellow = 2 units of Jeuveau per injection\\nGreen = 3 units of Jeuveau per injection\\nBlue = 4 units of Jeuveau per injection\\nBrown = 5 units of Jeuveau per injection\\n\\nWritten consent obtained. Risks include but not limited to lid/brow ptosis, bruising, swelling, diplopia, temporary effect, incomplete chemical denervation.

## 2024-02-01 DIAGNOSIS — Z88.9 MULTIPLE ALLERGIES: ICD-10-CM

## 2024-02-05 RX ORDER — MONTELUKAST SODIUM 10 MG/1
TABLET ORAL
Qty: 90 TABLET | Refills: 3 | OUTPATIENT
Start: 2024-02-05